# Patient Record
Sex: MALE
[De-identification: names, ages, dates, MRNs, and addresses within clinical notes are randomized per-mention and may not be internally consistent; named-entity substitution may affect disease eponyms.]

---

## 2019-06-25 ENCOUNTER — APPOINTMENT (OUTPATIENT)
Dept: NEUROSURGERY | Facility: CLINIC | Age: 69
End: 2019-06-25
Payer: COMMERCIAL

## 2019-06-25 VITALS — HEIGHT: 68 IN | WEIGHT: 156 LBS | BODY MASS INDEX: 23.64 KG/M2

## 2019-06-25 DIAGNOSIS — I45.6 PRE-EXCITATION SYNDROME: ICD-10-CM

## 2019-06-25 PROBLEM — Z00.00 ENCOUNTER FOR PREVENTIVE HEALTH EXAMINATION: Status: ACTIVE | Noted: 2019-06-25

## 2019-06-25 PROCEDURE — 99204 OFFICE O/P NEW MOD 45 MIN: CPT

## 2019-07-08 NOTE — PLAN
[FreeTextEntry1] : I do believe his weakness, atrophy and radicular pain is concordant to the large right L4-5 foraminal/extra-foraminal disc herniation that is obliterating the foramen and causing severe L4 nerve compression. I have recommended he undergo right L4-5 minimally invasive discectomy. The risk, benefits and alternatives were discussed at length including but not limited to bleeding, infection, CSF leak, nerve damage, recurrent disc herniation, lack of pain relief, worsening of pain, need for further procedures. all questions were answered and he wished to proceed.

## 2019-07-08 NOTE — HISTORY OF PRESENT ILLNESS
[de-identified] : This is a darius 68 year old male with a history of pain radiating from the back down the RLE with associated numbness and tingling. The pain radiated to the shin and occaisionally down to ankle. He was diagnosed with femoral neuropathy and was able to manage with exercise and over the counter medications as needed. In the last 4 moths he has developed severe intractable pain, atrophy of the right quadriceps. MRI of the lumbar spine was significant for a large right L4-5 foraminal disc herniation.

## 2019-07-12 ENCOUNTER — APPOINTMENT (OUTPATIENT)
Dept: NEUROSURGERY | Facility: CLINIC | Age: 69
End: 2019-07-12
Payer: COMMERCIAL

## 2019-07-12 VITALS — HEIGHT: 68 IN | WEIGHT: 154 LBS | BODY MASS INDEX: 23.34 KG/M2

## 2019-07-12 PROCEDURE — 99024 POSTOP FOLLOW-UP VISIT: CPT

## 2019-07-12 NOTE — HISTORY OF PRESENT ILLNESS
[FreeTextEntry1] : s/p microdiscectomy.  Radicular pain resolved.  He worked 9 hours yesterday and has been riding his bike, lifting, and started experiencing back pain.  Wound clean dry and intact.  \par \par Instructed him to abide by limitations.  Return in 2 weeks.

## 2019-07-23 ENCOUNTER — APPOINTMENT (OUTPATIENT)
Dept: NEUROSURGERY | Facility: CLINIC | Age: 69
End: 2019-07-23
Payer: COMMERCIAL

## 2019-07-23 VITALS — BODY MASS INDEX: 23.04 KG/M2 | WEIGHT: 152 LBS | HEIGHT: 68 IN

## 2019-07-23 PROCEDURE — 99024 POSTOP FOLLOW-UP VISIT: CPT

## 2019-07-23 RX ORDER — OXYCODONE AND ACETAMINOPHEN 5; 325 MG/1; MG/1
5-325 TABLET ORAL
Qty: 90 | Refills: 0 | Status: DISCONTINUED | COMMUNITY
Start: 2019-05-16 | End: 2019-07-23

## 2019-07-23 RX ORDER — OXYCODONE AND ACETAMINOPHEN 5; 325 MG/1; MG/1
5-325 TABLET ORAL
Qty: 30 | Refills: 0 | Status: DISCONTINUED | COMMUNITY
Start: 2019-06-26 | End: 2019-07-23

## 2019-07-26 NOTE — ASSESSMENT
[FreeTextEntry1] : Pt to finish antibiotics today. Pt still overly active, Have recommended some restrictions on activity as he heels. Pt unlikely to head instructions. otherwise doing very well. Will finish antibiotics today. No further radicular pain. Return of full strength in hip flexor. atrophy of quad improved.

## 2019-07-26 NOTE — HISTORY OF PRESENT ILLNESS
[FreeTextEntry1] : minimal serous drainage from wound. No fevers. Wound healing, no erythema. no evidence infection.

## 2019-08-06 ENCOUNTER — TRANSCRIPTION ENCOUNTER (OUTPATIENT)
Age: 69
End: 2019-08-06

## 2019-08-14 ENCOUNTER — APPOINTMENT (OUTPATIENT)
Dept: NEUROSURGERY | Facility: CLINIC | Age: 69
End: 2019-08-14
Payer: COMMERCIAL

## 2019-08-14 PROCEDURE — 99024 POSTOP FOLLOW-UP VISIT: CPT

## 2019-08-16 NOTE — HISTORY OF PRESENT ILLNESS
[FreeTextEntry1] : s/p microdiscectomy s/p wound revision.  Doing well.  No radicular pain, strength recovered.  Wound clean dry and intact.  No drainage.  Stitches removed.\par \par Return in 2 weeks for follow-up.

## 2019-09-10 ENCOUNTER — APPOINTMENT (OUTPATIENT)
Dept: NEUROSURGERY | Facility: CLINIC | Age: 69
End: 2019-09-10

## 2019-09-17 ENCOUNTER — APPOINTMENT (OUTPATIENT)
Dept: NEUROSURGERY | Facility: CLINIC | Age: 69
End: 2019-09-17
Payer: COMMERCIAL

## 2019-09-17 VITALS — BODY MASS INDEX: 23.79 KG/M2 | WEIGHT: 157 LBS | HEIGHT: 68 IN

## 2019-09-17 DIAGNOSIS — M54.16 RADICULOPATHY, LUMBAR REGION: ICD-10-CM

## 2019-09-17 PROCEDURE — 99024 POSTOP FOLLOW-UP VISIT: CPT

## 2020-01-06 ENCOUNTER — APPOINTMENT (OUTPATIENT)
Dept: OTOLARYNGOLOGY | Facility: CLINIC | Age: 70
End: 2020-01-06
Payer: COMMERCIAL

## 2020-01-06 VITALS — WEIGHT: 157 LBS | BODY MASS INDEX: 23.79 KG/M2 | HEIGHT: 68 IN

## 2020-01-06 DIAGNOSIS — H93.299 OTHER ABNORMAL AUDITORY PERCEPTIONS, UNSPECIFIED EAR: ICD-10-CM

## 2020-01-06 DIAGNOSIS — H71.91 UNSPECIFIED CHOLESTEATOMA, RIGHT EAR: ICD-10-CM

## 2020-01-06 DIAGNOSIS — Z83.438 FAMILY HISTORY OF OTHER DISORDER OF LIPOPROTEIN METABOLISM AND OTHER LIPIDEMIA: ICD-10-CM

## 2020-01-06 PROCEDURE — 69220 CLEAN OUT MASTOID CAVITY: CPT

## 2020-01-06 PROCEDURE — 92550 TYMPANOMETRY & REFLEX THRESH: CPT

## 2020-01-06 PROCEDURE — 99203 OFFICE O/P NEW LOW 30 MIN: CPT | Mod: 25

## 2020-01-06 PROCEDURE — 92557 COMPREHENSIVE HEARING TEST: CPT

## 2020-01-06 NOTE — DATA REVIEWED
[de-identified] : Complete audiometry was ordered and completed today. This was separately reported by the audiologist. The results were reviewed in detail with the patient.\par \par

## 2020-01-06 NOTE — ASSESSMENT
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 This was reviewed with the patient. Clinical monitoring recommended with regular followup for mastoid maintenance.

## 2020-01-06 NOTE — HISTORY OF PRESENT ILLNESS
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 He underwent right ear surgery in 1999.\par \par Approximately 2005: Right tympanomastoidectomy residual mastoid cholesteatoma with intense granulation and fibrotic reaction ossicular chain intact.\par \par  [FreeTextEntry1] : 01/06/2020 \par Presents today for right infectious ear disease. Became concerned when he developed a flaky discharge from the right postauricular area. No pain or otorrhea. No proceed change in hearing.

## 2020-01-06 NOTE — PHYSICAL EXAM
[Midline] : trachea located in midline position [Normal] : no rashes [FreeTextEntry1] : Microscopic ear exam with Mastoid debridement:\par \par Right ear: \par Obstructing cerumen debrided with a curet and alligator forceps. Shallow cavity with retraction in the posterior superior quadrant. The visible limits. No inflammation. \par The postauricular sulcus is deep but the skin is intact with mild retention of keratin debris.\par \par Left ear: The ear canal was patent and nonobstructed.  The tympanic membrane was intact and noninflamed.\par

## 2023-05-31 ENCOUNTER — APPOINTMENT (OUTPATIENT)
Dept: OTOLARYNGOLOGY | Facility: CLINIC | Age: 73
End: 2023-05-31
Payer: COMMERCIAL

## 2023-05-31 VITALS — BODY MASS INDEX: 23.64 KG/M2 | WEIGHT: 156 LBS | HEIGHT: 68 IN

## 2023-05-31 DIAGNOSIS — H90.A31 MIXED CONDUCTIVE AND SENSORINEURAL HEARING LOSS, UNILATERAL, RIGHT EAR WITH RESTRICTED HEARING ON THE  CONTRALATERAL SIDE: ICD-10-CM

## 2023-05-31 DIAGNOSIS — Z86.69 PERSONAL HISTORY OF OTHER DISEASES OF THE NERVOUS SYSTEM AND SENSE ORGANS: ICD-10-CM

## 2023-05-31 DIAGNOSIS — H90.A22 SENSORINEURAL HEARING LOSS, UNILATERAL, LEFT EAR, WITH RESTRICTED HEARING ON THE CONTRALATERAL SIDE: ICD-10-CM

## 2023-05-31 DIAGNOSIS — H90.11 CONDUCTIVE HEARING LOSS, UNILATERAL, RIGHT EAR, WITH UNRESTRICTED HEARING ON THE CONTRALATERAL SIDE: ICD-10-CM

## 2023-05-31 DIAGNOSIS — H61.20 IMPACTED CERUMEN, UNSPECIFIED EAR: ICD-10-CM

## 2023-05-31 DIAGNOSIS — H69.81 OTHER SPECIFIED DISORDERS OF EUSTACHIAN TUBE, RIGHT EAR: ICD-10-CM

## 2023-05-31 PROCEDURE — 92567 TYMPANOMETRY: CPT

## 2023-05-31 PROCEDURE — 99203 OFFICE O/P NEW LOW 30 MIN: CPT | Mod: 25

## 2023-05-31 PROCEDURE — G0268 REMOVAL OF IMPACTED WAX MD: CPT

## 2023-05-31 PROCEDURE — 92557 COMPREHENSIVE HEARING TEST: CPT

## 2023-05-31 RX ORDER — SUMATRIPTAN 50 MG/1
TABLET, FILM COATED ORAL
Refills: 0 | Status: ACTIVE | COMMUNITY

## 2023-05-31 NOTE — ASSESSMENT
[FreeTextEntry1] : Symptoms and test evidence of chronic eustachian tube dysfunction.  Mild conductive hearing loss in the right ear.  Management options reviewed including balloon dilation of the eustachian tube.  Amplification discussed.\par \par Annual follow-up recommended with cerumen management and reassessment.  And follow-up as needed.

## 2023-05-31 NOTE — PHYSICAL EXAM
[Normal] : mucosa is normal [Midline] : trachea located in midline position [FreeTextEntry1] : Microscopic ear exam with cerumen debridement:\par \par Right ear: Obstructing cerumen was debrided from the ear canal using suction, and curet.  The ear canal was within normal limits.  The tympanic membrane was intact and noninflamed.  Postsurgical changes.\par \par Left ear: The ear canal was patent and nonobstructed.  The tympanic membrane was intact and noninflamed.  Limited area of retraction posteriorly

## 2023-05-31 NOTE — DATA REVIEWED
[de-identified] : In light of the patients current symptoms, Complete audiometry was ordered and completed today. I have interpreted these results and reviewed them in detail with the patient.\par \par Mild conductive hearing loss in the right ear with abnormal tympanometry

## 2023-05-31 NOTE — HISTORY OF PRESENT ILLNESS
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 He underwent right ear surgery in 1999.\par \par Approximately 2005: Right tympanomastoidectomy residual mastoid cholesteatoma with intense granulation and fibrotic reaction ossicular chain intact.\par \par  [FreeTextEntry1] : 05/31/2023 \par Patient reports of sudden decreased of hearing in the right ear, which has largely recovered.  Hearing loss associated with URI and cerumen.  No otorrhea.  No ear pain.